# Patient Record
Sex: FEMALE | Race: WHITE
[De-identification: names, ages, dates, MRNs, and addresses within clinical notes are randomized per-mention and may not be internally consistent; named-entity substitution may affect disease eponyms.]

---

## 2017-03-27 ENCOUNTER — HOSPITAL ENCOUNTER (OUTPATIENT)
Dept: HOSPITAL 80 - FIMAGING | Age: 37
End: 2017-03-27
Attending: OBSTETRICS & GYNECOLOGY
Payer: COMMERCIAL

## 2017-03-27 DIAGNOSIS — Z3A.12: ICD-10-CM

## 2017-03-27 DIAGNOSIS — O09.521: ICD-10-CM

## 2017-03-27 DIAGNOSIS — O35.1XX0: Primary | ICD-10-CM

## 2017-11-20 ENCOUNTER — HOSPITAL ENCOUNTER (OUTPATIENT)
Dept: HOSPITAL 80 - FIMAGING | Age: 37
End: 2017-11-20
Attending: ADVANCED PRACTICE MIDWIFE
Payer: COMMERCIAL

## 2017-11-20 DIAGNOSIS — Z3A.12: ICD-10-CM

## 2017-11-20 DIAGNOSIS — O09.521: Primary | ICD-10-CM

## 2018-01-04 ENCOUNTER — HOSPITAL ENCOUNTER (OUTPATIENT)
Dept: HOSPITAL 80 - FIMAGING | Age: 38
End: 2018-01-04
Attending: ADVANCED PRACTICE MIDWIFE
Payer: COMMERCIAL

## 2018-01-04 DIAGNOSIS — O09.522: Primary | ICD-10-CM

## 2018-01-04 DIAGNOSIS — O35.1XX0: ICD-10-CM

## 2018-01-04 DIAGNOSIS — Z3A.19: ICD-10-CM

## 2018-01-30 ENCOUNTER — HOSPITAL ENCOUNTER (OUTPATIENT)
Dept: HOSPITAL 80 - FIMAGING | Age: 38
End: 2018-01-30
Attending: ADVANCED PRACTICE MIDWIFE
Payer: COMMERCIAL

## 2018-01-30 DIAGNOSIS — Z3A.22: ICD-10-CM

## 2018-01-30 DIAGNOSIS — O09.522: Primary | ICD-10-CM

## 2018-05-19 ENCOUNTER — HOSPITAL ENCOUNTER (INPATIENT)
Dept: HOSPITAL 80 - FLD | Age: 38
LOS: 2 days | Discharge: HOME | End: 2018-05-21
Attending: OBSTETRICS & GYNECOLOGY | Admitting: ADVANCED PRACTICE MIDWIFE
Payer: COMMERCIAL

## 2018-05-19 DIAGNOSIS — Z87.59: ICD-10-CM

## 2018-05-19 DIAGNOSIS — Z3A.38: ICD-10-CM

## 2018-05-19 LAB — PLATELET # BLD: 236 10^3/UL (ref 150–400)

## 2018-05-19 PROCEDURE — 0KQM0ZZ REPAIR PERINEUM MUSCLE, OPEN APPROACH: ICD-10-PCS | Performed by: ADVANCED PRACTICE MIDWIFE

## 2018-05-19 PROCEDURE — 3E033VJ INTRODUCTION OF OTHER HORMONE INTO PERIPHERAL VEIN, PERCUTANEOUS APPROACH: ICD-10-PCS | Performed by: ADVANCED PRACTICE MIDWIFE

## 2018-05-19 RX ADMIN — IBUPROFEN PRN MG: 600 TABLET ORAL at 21:10

## 2018-05-19 RX ADMIN — DOCUSATE SODIUM PRN MG: 100 CAPSULE, LIQUID FILLED ORAL at 21:10

## 2018-05-19 NOTE — PDMN
Medical Necessity


Medical necessity: C/M review:  Patient meets INPT criteria under [facility] [id number] 

Vaginal delivery:  viable male .  CNM anticipates > 2 MN LOS for ongoing 

med for eval and TX of above.

## 2018-05-19 NOTE — PDGENHP
History and Physical


History and Physical: 








PRENATAL CARE:  [facility]





HPI: 


 Patient is a 36 yo G 4  P  1 at 38.2 weeks ega that presents to L&D with 

complaints of painful contractions since 0800 this am with leaking fluid which 

she thinks started at that time.  Baby has been active, denies vaginal 

bleeding.   


 EDC: 2018 which is based on LMP:17 which is known and consistent 

with Ultrasound at 7 weeks.


 Her pregnancy is complicated by:  


AMA


Bilateral choroid plexus cysts at 20 weeks that resolved on F/U ultrasound


Post partum hemorrhage with G1


Low GLORY G1 - WNL at 34 weeks with this pregnancy


h/o PVCs - normal cardiac workup


h/o T21 fetus with previous pregnancy- TAB- 


Anemia - hct 34.4 at 28 weeks 





Review of Systems:


 Constitutional: Denies any fever, chills, or fatigue


 HEENT: denies any visual changes, difficulty swallowing, hearing loss


 Cardiovascular: Denies any chest pain, palpitations, leg swelling


 Respiratory: denies any cough, wheezing, or shortness of breathe


 GI: Denies any nausea, vomiting, diarrhea, constipation


 : denies any dysuria, urgency, frequency, vaginal bleeding


 Musculoskeletal: denies any muscle or bone pain


 Skin: denies any rashes


 Neuro: denies any headache, seizures, lightheadedness, dizziness, or loss of 

consciousness


 Psychiatric: denies any depression, anxiety, or SI/HI thoughts





HISTORY: 


 Previous OB history:    -low GLORY and PPH;  TAB -T21 fetus,  SAB


 Past medical history:  h/o PVCs-normal cardiac workup; HSV 1; h/o abn pap-pos 

HPV-, colpo- biopsy neg, all normal paps since then


 Past surgical history: TAB 2017


 Medications: PNV iron 


 Allergies (list reaction): NKDA





PRENATAL LABS:


 Rh: A pos 


 ABS: Neg


 Rubella: Immune


 HbsAg: NR


 HIV: NR


 VDRL: NR


 1hr:  101


 GC: Neg


 Chlamydia: Neg 


 Pap: Normal


 GBS:  neg 





BMI: 19





PHYSICAL EXAM:


 Constitutional: WN, A&Ox3


 HEENT: normocephalic atraumatic, supple


 Heart: RRR, no murmur


 Chest: CTA-B


 Abdomen: Soft, nontender, gravid


 SVE: 6/90/0


 Extremities: tr edema, negative sanchez's sign


 Neuro: grossly normal


 Psych: normal affect





Fetal assessment: Reassuring FHTs, baseline 140s,  +accels, no decels, moderate 

variability 


 Contractions: toco q 2-3





Assessment: 


 1) 36 yo G 4 P 1 with IUP@ 38.2wks ega


 2) Active labor


 3) GBS neg 


 4) Cat 1 FHR tracing





Plan: 


 1) Admit to L&D


 2) Intermittent monitoring after reactive NST per protocol


 3) Diet as tolerated 


 4) Pain control as patient desires 


 5) Anticipate

## 2018-05-19 NOTE — OBDEL
Birth Info


Birth Type: Vaginal


Presentation at Delivery: Vertex


L&D Analgesia/Anesthesia Type: Nitrous


GBS+: No


Indications for Delivery: Spontaneous Labor





Vaginal Delivery





- Delivery Provider


Delivery Physician/CNM: Heather Aguayo





- Labor and Delivery


Onset of Contractions Date: 18


Onset of Contractions Time: 10:40


Rupture of Membranes Date: 18


Rupture of Membranes Time: 08:00


Dilation Complete Date: 18


Dilation Complete Time: 12:20


Placenta Delivery Date: 18


Placenta Delivery Time: 12:49


Total Hours of Labor: 2


Laceration: 2nd Degree


Repair: 3-0, Vicryl


Vaginal Sponge Count Correct: Yes


Vaginal Needle Count Correct: Yes


Vaginal Sweep Performed: No


EBL: 200


Delivery Events: Nuchal Cord





 Birth Data


CONSTANTINE: 18


Gestational Age: 38 week(s) and 2 day(s)


  ** Harris


Delivery Date: 18


Delivery Time: 12:40


Sex of Infant: Male


Apgar Score (1 Min): 7


Apgar Score (5 Min): 8





ICD10 Worksheet


Patient Problems: 


 Problems











Problem Status Onset


 


Active labor at term Acute  


 


 (spontaneous vaginal delivery) Acute

## 2018-05-20 VITALS — DIASTOLIC BLOOD PRESSURE: 56 MMHG | SYSTOLIC BLOOD PRESSURE: 94 MMHG

## 2018-05-20 RX ADMIN — IBUPROFEN PRN MG: 600 TABLET ORAL at 09:38

## 2018-05-20 RX ADMIN — IBUPROFEN PRN MG: 600 TABLET ORAL at 21:30

## 2018-05-20 RX ADMIN — IBUPROFEN PRN MG: 600 TABLET ORAL at 15:25

## 2018-05-20 RX ADMIN — DOCUSATE SODIUM PRN MG: 100 CAPSULE, LIQUID FILLED ORAL at 21:30

## 2018-05-20 RX ADMIN — DOCUSATE SODIUM PRN MG: 100 CAPSULE, LIQUID FILLED ORAL at 09:39

## 2018-05-20 RX ADMIN — IBUPROFEN PRN MG: 600 TABLET ORAL at 02:47

## 2018-05-20 NOTE — OBPP
PostPartum Progress Note


Assessment/Plan: 


Assessment:








s/p  PPD # 1 - pt is stable














Plan:





Continue routine pp care


Plan for d/c home in am 18 12:34





Subjective/Postpartum Course: 





18 12:34


Pt seen and examined. Doing well with no complaints. Mild cramping, taking 

Motrin. Mod lochia. She is OOB, betty diet, voiding without difficulty since 

straight cath, passing flatus. No BM yet. BF without difficulty. Wants to go 

home in am .


Objective: 





 





 18 12:12 





 











Patient ABO/Rh  A POSITIVE   18  12:12    








 











Temp Pulse Resp BP Pulse Ox


 


 36.4 C   52 L  16   110/70   98 


 


 18 08:00  18 08:00  18 08:00  18 08:00  18 08:00











Uterine Position/Fundal Height: Umbilicus -2


Uterine Tone: Firm





PostPartum Physical Exam





- Physical Exam


Respiratory: lungs clear, normal breath sounds


Cardiac/Chest: regular rate, rhythm


Abdomen: normal bowel sounds, non-tender, soft, flatus (+)


Extremities: non-tender, normal inspection


Skin: normal color, warm/dry


Neuro/Psych: alert, normal mood/affect, oriented x 3

## 2018-05-21 RX ADMIN — IBUPROFEN PRN MG: 600 TABLET ORAL at 12:31

## 2018-05-21 RX ADMIN — IBUPROFEN PRN MG: 600 TABLET ORAL at 05:56

## 2018-05-21 RX ADMIN — DOCUSATE SODIUM PRN MG: 100 CAPSULE, LIQUID FILLED ORAL at 12:41

## 2018-05-21 NOTE — OBGCSDC
General Delivery Information





- General Info


: 3


Para: 2


Abortions: 1


Birth Type: Vaginal


L&D Analgesia/Anesthesia Type: Local


Admission Date: 18


Labs: 





 











Patient ABO/Rh  A POSITIVE   18  12:12    


 


Hct  37.4 % (38.0-47.0)  L  18  12:12    














- Hospital Course


Postpartum: 





18 12:34


Pt seen and examined. Doing well with no complaints. Mild cramping, taking 

Motrin. Mod lochia. She is OOB, betty diet, voiding without difficulty since 

straight cath, passing flatus. No BM yet. BF without difficulty. Wants to go 

home in am .


18 13:10


patient is doing well.  pain is well controlled.  normal lochia.  denies 

headache and changes in vision.  breast feeding is going well.   ready to go 

home.  mood great.  





Vaginal Birth





- Delivery Provider


Delivery Physician/CNM: Heather Aguayo





- Diagnosis


Laceration: 2nd Degree


Repair: 3-0, Vicryl


Delivery Events: Nuchal Cord





 Birth





-  Delivery


EBL: 200





 Birth Data


CONSTANTINE: 18


Gestational Age: 38 week(s) and 4 day(s)


  ** Harris


Delivery Date: 18


Delivery Time: 12:40


Sex of Infant: Male


 Weight (gm): 2984 kg


Apgar Score (1 Min): 7


Apgar Score (5 Min): 8





Discharge Information





- Discharge Information


Condition: Good


Instruction/Follow Up: Four Weeks, Six Weeks

## 2018-05-21 NOTE — OBPP
PostPartum Progress Note


Assessment/Plan: 


Assessment:





p2 ppd# 2 s/p  with second degree lac


breast feeding








Plan:


routine post partum care





18 13:09





Subjective/Postpartum Course: 





18 12:34


Pt seen and examined. Doing well with no complaints. Mild cramping, taking 

Motrin. Mod lochia. She is OOB, betty diet, voiding without difficulty since 

straight cath, passing flatus. No BM yet. BF without difficulty. Wants to go 

home in am .


18 13:10


patient is doing well.  pain is well controlled.  normal lochia.  denies 

headache and changes in vision.  breast feeding is going well.   ready to go 

home.  mood great.  


Objective: 





 





 18 12:12 





 











Patient ABO/Rh  A POSITIVE   18  12:12    








 











Temp Pulse Resp BP Pulse Ox


 


 36.8 C   37 L  16   94/56 L  96 


 


 18 07:40  18 07:40  18 07:40  18 19:49  18 07:40














PostPartum Physical Exam





- Physical Exam


Neck: non-tender, full range of motion, supple


Respiratory: chest non-tender, lungs clear, normal breath sounds


Cardiac/Chest: normal peripheral pulses, regular rate, rhythm


Abdomen: normal bowel sounds, non-tender


Extremities: normal range of motion, non-tender, normal inspection, normal 

capillary refill


Skin: normal color, warm/dry


Neuro/Psych: no motor/sensory deficits, alert, normal mood/affect, oriented x 3